# Patient Record
Sex: FEMALE | Race: WHITE | URBAN - METROPOLITAN AREA
[De-identification: names, ages, dates, MRNs, and addresses within clinical notes are randomized per-mention and may not be internally consistent; named-entity substitution may affect disease eponyms.]

---

## 2021-04-06 ENCOUNTER — OFFICE VISIT (OUTPATIENT)
Dept: ORTHOPEDIC SURGERY | Age: 81
End: 2021-04-06
Payer: MEDICARE

## 2021-04-06 VITALS — WEIGHT: 180 LBS | BODY MASS INDEX: 31.89 KG/M2 | HEIGHT: 63 IN | TEMPERATURE: 97.7 F

## 2021-04-06 DIAGNOSIS — M25.511 BILATERAL SHOULDER PAIN, UNSPECIFIED CHRONICITY: ICD-10-CM

## 2021-04-06 DIAGNOSIS — M19.019 ARTHRITIS OF SHOULDER: Primary | ICD-10-CM

## 2021-04-06 DIAGNOSIS — M25.512 BILATERAL SHOULDER PAIN, UNSPECIFIED CHRONICITY: ICD-10-CM

## 2021-04-06 PROCEDURE — 20610 DRAIN/INJ JOINT/BURSA W/O US: CPT | Performed by: ORTHOPAEDIC SURGERY

## 2021-04-06 PROCEDURE — G8400 PT W/DXA NO RESULTS DOC: HCPCS | Performed by: ORTHOPAEDIC SURGERY

## 2021-04-06 PROCEDURE — 1036F TOBACCO NON-USER: CPT | Performed by: ORTHOPAEDIC SURGERY

## 2021-04-06 PROCEDURE — G8417 CALC BMI ABV UP PARAM F/U: HCPCS | Performed by: ORTHOPAEDIC SURGERY

## 2021-04-06 PROCEDURE — 1123F ACP DISCUSS/DSCN MKR DOCD: CPT | Performed by: ORTHOPAEDIC SURGERY

## 2021-04-06 PROCEDURE — G8427 DOCREV CUR MEDS BY ELIG CLIN: HCPCS | Performed by: ORTHOPAEDIC SURGERY

## 2021-04-06 PROCEDURE — 99204 OFFICE O/P NEW MOD 45 MIN: CPT | Performed by: ORTHOPAEDIC SURGERY

## 2021-04-06 PROCEDURE — 4040F PNEUMOC VAC/ADMIN/RCVD: CPT | Performed by: ORTHOPAEDIC SURGERY

## 2021-04-06 PROCEDURE — 1090F PRES/ABSN URINE INCON ASSESS: CPT | Performed by: ORTHOPAEDIC SURGERY

## 2021-04-06 RX ORDER — LOSARTAN POTASSIUM 100 MG/1
100 TABLET ORAL DAILY
COMMUNITY
Start: 2020-11-20

## 2021-04-06 RX ORDER — HYDROCHLOROTHIAZIDE 25 MG/1
25 TABLET ORAL DAILY
COMMUNITY
Start: 2020-11-20

## 2021-04-06 RX ORDER — METFORMIN HYDROCHLORIDE 500 MG/1
500 TABLET, EXTENDED RELEASE ORAL
COMMUNITY
Start: 2020-11-19

## 2021-04-06 NOTE — PROGRESS NOTES
Chief Complaint  Chief Complaint   Patient presents with    Shoulder Pain     Old patient, new problem, Bilateral shoulder pain, left worse than right       History of Present Illness:  Espinoza Domínguez is a 68 y.o. female here for evaluation of the left shoulder. Patient is known to Dr. Sonam Martin and was last seen in 2016 for left shoulder osteoarthritis. She was given a cortisone injection into the left shoulder at that time which lasted her for several years. She returns today for 1 year history of returned left shoulder pain which started when she fell out of bed. Since that time she has tried topical anti-inflammatories without much relief. She would like to receive another cortisone injection today. She denies any mechanical symptoms of the shoulder including locking or catching but does endorse popping    Medical History:  Patient's medications, allergies, past medical, surgical, social and family histories were reviewed and updated as appropriate. Pertinent items are noted in HPI  Review of systems reviewed from Patient History Form dated on 4/6/2021 and available in the patient's chart under the Media tab. Vital Signs:  Vitals:    04/06/21 1045   Temp: 97.7 °F (36.5 °C)         Neuro: Alert & oriented x 3,  normal,  no focal deficits noted. Normal affect. Eyes: sclera clear  Ears: Normal external ear  Mouth:  No perioral lesions  Pulm: Respirations unlabored and regular  Pulse: Regular rate and rhythm   Skin: Warm, well perfused      Constitutional: The physical examination finds the patient to be well-developed and well-nourished. The patient is alert and oriented x3 and was cooperative throughout the visit. Left shoulder exam    Inspection:  Held in a normal posture. Normal contour at the acromioclavicular joint. No swelling, ecchymosis, or erythema about the shoulder. No atrophy appreciated. No scapular winging.      Palpation: Mild tenderness to palpation over the posterior joint line.  No subacromial crepitus. No tenderness of the AC joint. No greater tuberosity tenderness. No tenderness in the bicipital groove. Range of Motion: Forward flexion and abduction to 90 degrees, external rotation to 15 degrees and internal rotation to the back pocket    Strength: 4+/5 supraspinatus, infraspinatus, and subscapularis muscle strength which is symmetric bilaterally. Stability: No anterior instability. No posterior instability. Special Tests: Belly press sign is negative. Lift off sign is negative. Other findings: The skin is warm dry and well perfused. 2+ radial pulse. Sensation is intact to light touch over the deltoid. Right comparison shoulder exam    Inspection:  Held in a normal posture. Normal contour at the acromioclavicular joint. No swelling, ecchymosis, or erythema about the shoulder. No atrophy appreciated. No scapular winging. Palpation:  No subacromial crepitus. No tenderness of the AC joint. No greater tuberosity tenderness. No tenderness in the bicipital groove. Range of Motion: Full passive and active ROM. Normal scapulothoracic rhythm. Strength:  4+/5 supraspinatus, infraspinatus, and subscapularis muscle strength which is symmetric bilaterally. Stability: No anterior instability. No posterior instability. Special Tests: Impingement findings are negative. Labral findings are negative. Speed sign and Yergason signs are both negative. Crossover sign is negative. Belly press sign is negative. Lift off sign is negative. Other findings: The skin is warm dry and well perfused. 2+ radial pulse. Sensation is intact to light touch over the deltoid.       Radiology:     Plain radiographs of bilateral shoulder comprising AP, axillary lateral and outlet views: Radiographs were obtained and reviewed in the office:     Impression: Left shoulder demonstrates severe/advanced bone-on-bone arthritis in the right shoulder demonstrates moderate to severe osteoarthritic changes. There is no obvious evidence of superior humeral head migration         Assessment :  68 y.o. female with advanced osteoarthritis of bilateral shoulders, left greater than right    Impression:  Encounter Diagnosis   Name Primary?  Bilateral shoulder pain, unspecified chronicity Yes       Office Procedures:  Orders Placed This Encounter   Procedures    XR SHOULDER LEFT (MIN 2 VIEWS)     Standing Status:   Future     Number of Occurrences:   1     Standing Expiration Date:   4/6/2022     Order Specific Question:   Reason for exam:     Answer:   shoulder pain    XR SHOULDER RIGHT (MIN 2 VIEWS)     Standing Status:   Future     Number of Occurrences:   1     Standing Expiration Date:   4/6/2022     Order Specific Question:   Reason for exam:     Answer:   shoulder pain           Plan: We had a long discussion with Taiwo Doyle today regarding her bilateral shoulder pain. Her left shoulder bothers her much more than her right and is stopping her from doing her activities of daily living. She is requesting a cortisone injection into the left shoulder today and we feel that that is an appropriate next step. She had good results from her prior injection. We also had a good discussion with her regarding the long-term treatment for this arthritis being a shoulder replacement. Taiwo Doyle does not want to have that discussion at this time until cortisone injections stop working for her. We also provided her with a therapy referral to show her some home exercise and stretching program and provided her with a printout to supplement that. We will see her back as needed. Paola Hernandez is in agreement with this plan. All questions were answered to patient's satisfaction and was encouraged to call with any further questions. The indications and risks of steroid injection as well as treatment alternatives were discussed with the patient who consented to the procedure.  Under sterile conditions and after informed consent was obtained, using posterior approach the patient was given an injection into the Left shoulder glenohumeral joint. 2mL 40 mg of Depo-Medrol and 4 mL of 1% lidocaine were placed in the shoulder after it was prepped with chlorhexidine. This resulted in good relief of symptoms. There were no complications. The patient was advised to ice the shoulder this evening and to avoid vigorous activities for the next 2 days. They were advised to call us if there was any erythema, enduration, swelling or increasing pain. Total time spent for evaluation, education and development of treatment plan: 39 minutes    Dandre Mistry, [address] [facility]     04/06/21  11:57 AM    I attest that I met personally with the patient, performed the described exam, reviewed the radiographic studies and medical records associated with this patient and supervised the services that are described above.      Salvador Yanez MD

## 2021-12-21 ENCOUNTER — OFFICE VISIT (OUTPATIENT)
Dept: ORTHOPEDIC SURGERY | Age: 81
End: 2021-12-21
Payer: MEDICARE

## 2021-12-21 VITALS — HEIGHT: 62 IN | WEIGHT: 190 LBS | BODY MASS INDEX: 34.96 KG/M2

## 2021-12-21 DIAGNOSIS — M25.512 BILATERAL SHOULDER PAIN, UNSPECIFIED CHRONICITY: Primary | ICD-10-CM

## 2021-12-21 DIAGNOSIS — M25.511 BILATERAL SHOULDER PAIN, UNSPECIFIED CHRONICITY: Primary | ICD-10-CM

## 2021-12-21 PROCEDURE — G8484 FLU IMMUNIZE NO ADMIN: HCPCS | Performed by: ORTHOPAEDIC SURGERY

## 2021-12-21 PROCEDURE — G8427 DOCREV CUR MEDS BY ELIG CLIN: HCPCS | Performed by: ORTHOPAEDIC SURGERY

## 2021-12-21 PROCEDURE — 20610 DRAIN/INJ JOINT/BURSA W/O US: CPT | Performed by: ORTHOPAEDIC SURGERY

## 2021-12-21 PROCEDURE — 1123F ACP DISCUSS/DSCN MKR DOCD: CPT | Performed by: ORTHOPAEDIC SURGERY

## 2021-12-21 PROCEDURE — 1090F PRES/ABSN URINE INCON ASSESS: CPT | Performed by: ORTHOPAEDIC SURGERY

## 2021-12-21 PROCEDURE — 4040F PNEUMOC VAC/ADMIN/RCVD: CPT | Performed by: ORTHOPAEDIC SURGERY

## 2021-12-21 PROCEDURE — G8417 CALC BMI ABV UP PARAM F/U: HCPCS | Performed by: ORTHOPAEDIC SURGERY

## 2021-12-21 PROCEDURE — 99214 OFFICE O/P EST MOD 30 MIN: CPT | Performed by: ORTHOPAEDIC SURGERY

## 2021-12-21 PROCEDURE — G8400 PT W/DXA NO RESULTS DOC: HCPCS | Performed by: ORTHOPAEDIC SURGERY

## 2021-12-21 PROCEDURE — 1036F TOBACCO NON-USER: CPT | Performed by: ORTHOPAEDIC SURGERY

## 2021-12-21 RX ORDER — METHYLPREDNISOLONE ACETATE 40 MG/ML
40 INJECTION, SUSPENSION INTRA-ARTICULAR; INTRALESIONAL; INTRAMUSCULAR; SOFT TISSUE ONCE
Status: COMPLETED | OUTPATIENT
Start: 2021-12-21 | End: 2021-12-21

## 2021-12-21 RX ORDER — METHYLPREDNISOLONE ACETATE 40 MG/ML
40 INJECTION, SUSPENSION INTRA-ARTICULAR; INTRALESIONAL; INTRAMUSCULAR; SOFT TISSUE ONCE
Status: CANCELLED | OUTPATIENT
Start: 2021-12-21 | End: 2021-12-21

## 2021-12-21 RX ADMIN — METHYLPREDNISOLONE ACETATE 40 MG: 40 INJECTION, SUSPENSION INTRA-ARTICULAR; INTRALESIONAL; INTRAMUSCULAR; SOFT TISSUE at 17:11

## 2021-12-21 NOTE — PROGRESS NOTES
12/21/21 4:41 PM     Lidocaine Injection      NDC: [address]-87    Lot Number: [id number]    Body Part: left shoulder

## 2021-12-21 NOTE — PROGRESS NOTES
Chief Complaint    Follow-up (bilateral shoulders )      History of Present Illness:  Jeff Lamb is a 80 y.o. female here today for follow up evaluation of the bilateral shoulder. She has known osteoarthritis in both of her shoulders. She has had good relief of symptoms with previous corticosteroid injection on 4/6/2021. She returns today with an exacerbation of symptoms over the last month, no new injuries reported. She complains of constant shoulder pain, left worse than right. Pain is exacerbated with movement. She knows she is a candidate for shoulder replacements but is a breast and ovarian cancer survivor and would not like to pursue surgery. Medical History:  Patient's medications, allergies, past medical, surgical, social and family histories were reviewed and updated as appropriate. Pertinent items are noted in HPI  Review of systems reviewed from Patient History Form completed today and available in the patient's chart under the Media tab. Pain Assessment  Location of Pain: Shoulder  Location Modifiers: Left,Right  Severity of Pain: 4  Quality of Pain: Dull,Aching  Duration of Pain: Persistent  Frequency of Pain: Constant  Aggravating Factors:  (any movement)  Limiting Behavior: Yes  Relieving Factors: Rest  Result of Injury: No  Work-Related Injury: No  Are there other pain locations you wish to document?: No    Past Medical History:   Diagnosis Date    Cancer ([facility] [address]     Cervical spondylosis 5/19/2015    Glenohumeral arthritis 5/19/2015        History reviewed. No pertinent surgical history. History reviewed. No pertinent family history.     Social History     Socioeconomic History    Marital status:      Spouse name: None    Number of children: None    Years of education: None    Highest education level: None   Occupational History    None   Tobacco Use    Smoking status: Never Smoker    Smokeless tobacco: Never Used   Substance and Sexual Activity    Alcohol use: None  Drug use: None    Sexual activity: None   Other Topics Concern    None   Social History Narrative    None     Social Determinants of Health     Financial Resource Strain:     Difficulty of Paying Living Expenses: Not on file   Food Insecurity:     Worried About Running Out of Food in the Last Year: Not on file    [place] of Food in the Last Year: Not on file   Transportation Needs:     Lack of Transportation (Medical): Not on file    Lack of Transportation (Non-Medical): Not on file   Physical Activity:     Days of Exercise per Week: Not on file    Minutes of Exercise per Session: Not on file   Stress:     Feeling of Stress : Not on file   Social Connections:     Frequency of Communication with Friends and Family: Not on file    Frequency of Social Gatherings with Friends and Family: Not on file    Attends Spiritism Services: Not on file    Active Member of [address] [place] [address] [employer] or Organizations: Not on file    Attends Club or Organization Meetings: Not on file    Marital Status: Not on file   Intimate Partner Violence:     Fear of Current or Ex-Partner: Not on file    Emotionally Abused: Not on file    Physically Abused: Not on file    Sexually Abused: Not on file   Housing Stability:     Unable to Pay for Housing in the Last Year: Not on file    Number of [place] in the Last Year: Not on file    Unstable Housing in the Last Year: Not on file       Current Outpatient Medications   Medication Sig Dispense Refill    hydroCHLOROthiazide (HYDRODIURIL) 25 MG tablet Take 25 mg by mouth daily      losartan (COZAAR) 100 MG tablet Take 100 mg by mouth daily      metFORMIN (GLUCOPHAGE-XR) 500 MG extended release tablet Take 500 mg by mouth daily (with breakfast)      erythromycin (ROMYCIN) 5 MG/GM ophthalmic ointment        No current facility-administered medications for this visit. Allergies   Allergen Reactions    Codeine Nausea And Vomiting     Codeine Phosphate.        Vital signs:  Ht 5' 2\" (1.575 m)   Wt 190 lb (86.2 kg)   BMI 34.75 kg/m²            LEFT Shoulder Examination:    Inspection:  Held in a normal posture. Normal contour at the acromioclavicular joint. No swelling, ecchymosis, or erythema about the shoulder. No atrophy appreciated. Palpation:  Crepitus. Range of Motion: Marked limitation of motion. 45 degrees of forward flexion and abduction. 0 degrees of internal and external rotation. Strength:  Weakness. Stability: No anterior instability. No posterior instability. Special Tests:  Crossover sign is negative. Belly press sign is negative. Lift off sign is negative. Impingement findings are negative. Labral findings are negative. Speed sign and Yergason signs are both negative. Other findings: The skin is warm dry and well perfused. Distally neurovascularly intact. Sensation is intact to light touch over the deltoid. RIGHT Shoulder Examination:    Inspection:  Held in a normal posture. Normal contour at the acromioclavicular joint. No swelling, ecchymosis, or erythema about the shoulder. No atrophy appreciated. Palpation:  Crepitus. Range of Motion: Marked limitation of motion. Strength:  Weakness. Stability: No anterior instability. No posterior instability. Special Tests:  Crossover sign is negative. Belly press sign is negative. Lift off sign is negative. Impingement findings are negative. Labral findings are negative. Speed sign and Yergason signs are both negative. Other findings: The skin is warm dry and well perfused. Distally neurovascularly intact. Sensation is intact to light touch over the deltoid. Radiology:     Pertinent imaging was interpreted and reviewed with the patient. No new imaging was obtained during today's visit. Assessment :  Bilateral shoulder osteoarthritis    Impression:  Encounter Diagnosis   Name Primary?     Bilateral shoulder pain, unspecified chronicity Yes       Office Procedures:  Orders Placed This Encounter   Procedures    [place] ARTHROCENTESIS ASPIR&/INJ MAJOR JT/BURSA W/O US           Plan: The nature and natural history of osteoarthritis was discussed in detail the patient today. Treatment options both surgical and nonsurgical were discussed in detail. Patient was counseled with regard to the importance of activity modification as well as weight control. The role for medications, intra-articular injections as well as surgery were discussed. Patient's questions were answered.     Patient has known osteoarthritis of bilateral shoulders. She has had good relief of symptoms with previous corticosteroid injection in her left shoulder. She is a candidate for shoulder replacement but would like to conservative treatment at this time. She is a candidate for a repeat injection for her left shoulder pain and inflammation. She wishes to proceed. We can see her back in 1 week to proceed with the right shoulder. The indications and risks of steroid injection as well as treatment alternatives were discussed with the patient who consented to the procedure. Under sterile conditions and after informed consent was obtained, using posterior approach the patient was given an injection into the LEFT shoulder split equally between subacromial space and glenohumeral joint. 2mL 40 mg of Depo-Medrol and 4 mL of 1% lidocaine were placed in the shoulder after it was prepped with chlorhexidine. This resulted in good relief of symptoms. There were no complications. The patient was advised to ice the shoulder this evening and to avoid vigorous activities for the next 2 days. They were advised to call us if there was any erythema, enduration, swelling or increasing pain. I will see her back in 1 week to proceed with a right shoulder injection, sooner if needed. Dara Smith is in agreement with this plan.  All questions were answered to patient's satisfaction and was encouraged to call with any further questions. Total time spent for evaluation, education and development of treatment plan: 35 minutes        I, Fermin Cushing, ATC, am scribing for and in the presence of Dr. Rufino Painter. 12/21/21 5:36 PM Fermin Cushing, ATC             I attest that I met personally with the patient, performed the described exam, reviewed the radiographic studies and medical records associated with this patient and supervised the services that are described above.      Brett Ramirez MD

## 2021-12-29 ENCOUNTER — OFFICE VISIT (OUTPATIENT)
Dept: ORTHOPEDIC SURGERY | Age: 81
End: 2021-12-29
Payer: MEDICARE

## 2021-12-29 VITALS — WEIGHT: 190 LBS | HEIGHT: 62 IN | BODY MASS INDEX: 34.96 KG/M2

## 2021-12-29 DIAGNOSIS — M25.511 BILATERAL SHOULDER PAIN, UNSPECIFIED CHRONICITY: Primary | ICD-10-CM

## 2021-12-29 DIAGNOSIS — M25.512 BILATERAL SHOULDER PAIN, UNSPECIFIED CHRONICITY: Primary | ICD-10-CM

## 2021-12-29 DIAGNOSIS — M19.011 PRIMARY OSTEOARTHRITIS OF BOTH SHOULDERS: ICD-10-CM

## 2021-12-29 DIAGNOSIS — M19.012 PRIMARY OSTEOARTHRITIS OF BOTH SHOULDERS: ICD-10-CM

## 2021-12-29 PROCEDURE — 20610 DRAIN/INJ JOINT/BURSA W/O US: CPT | Performed by: ORTHOPAEDIC SURGERY

## 2021-12-29 RX ORDER — METHYLPREDNISOLONE ACETATE 40 MG/ML
40 INJECTION, SUSPENSION INTRA-ARTICULAR; INTRALESIONAL; INTRAMUSCULAR; SOFT TISSUE ONCE
Status: COMPLETED | OUTPATIENT
Start: 2021-12-29 | End: 2021-12-29

## 2021-12-29 RX ADMIN — METHYLPREDNISOLONE ACETATE 40 MG: 40 INJECTION, SUSPENSION INTRA-ARTICULAR; INTRALESIONAL; INTRAMUSCULAR; SOFT TISSUE at 12:16

## 2021-12-29 NOTE — PROGRESS NOTES
Follow-up (right shoulder )      History of Present Illness:  Carlos Hillman is a 80 y.o. female here for follow-up regarding her bilateral shoulders. As a review, she has known bilateral shoulder osteoarthritis. She underwent a left shoulder cortisone injection a week ago with some resolution of symptoms. She is here today to undergo a right shoulder intra-articular cortisone injection. Of note though she is a candidate for a shoulder replacement, she has a history of breast and ovarian cancer and would like to avoid surgery at all costs. Medical History:  Patient's medications, allergies, past medical, surgical, social and family histories were reviewed and updated as appropriate. Pain Assessment  Location of Pain: Shoulder  Location Modifiers: Right  Severity of Pain: 4  Quality of Pain: Dull,Aching  Duration of Pain: Persistent  Frequency of Pain: Constant  Aggravating Factors:  (any general shoulder movement)  Limiting Behavior: Yes  Relieving Factors: Rest  Result of Injury: No  Work-Related Injury: No  Are there other pain locations you wish to document?: No  ROS: Review of systems reviewed from Patient History Form completed today and available in the patient's chart under the Media tab. Pertinent items are noted in HPI  Review of systems reviewed from Patient History Form completed today and available in the patient's chart under the Media tab. Vital Signs:  Ht 5' 2\" (1.575 m)   Wt 190 lb (86.2 kg)   BMI 34.75 kg/m²     Right shoulder exam    Inspection:  Held in a normal posture. Normal contour at the acromioclavicular joint. No swelling, ecchymosis, or erythema about the shoulder. No atrophy appreciated. No scapular winging. Palpation:  Crepitus. No tenderness of the AC joint. No greater tuberosity tenderness. No tenderness in the bicipital groove. Range of Motion: Limited passive and active ROM    Strength:  Mild rotator cuff weakness    Stability: No anterior instability. No posterior instability. Special Tests: Impingement findings are mildly positive. Other findings: The skin is warm dry and well perfused. 2+ radial pulse. Sensation is intact to light touch over the deltoid. Left shoulder exam    Inspection:  Held in a normal posture. Normal contour at the acromioclavicular joint. No swelling, ecchymosis, or erythema about the shoulder. No atrophy appreciated. No scapular winging. Palpation:  Crepitus. No tenderness of the AC joint. No greater tuberosity tenderness. No tenderness in the bicipital groove. Range of Motion: Limited passive and active ROM    Strength:  Mild rotator cuff weakness    Stability: No anterior instability. No posterior instability. Special Tests: Impingement findings are mildly positive. Other findings: The skin is warm dry and well perfused. 2+ radial pulse. Sensation is intact to light touch over the deltoid. Radiology:       Pertinent imaging interpreted and reviewed with the patient today, images only - no report available. No new imaging was obtained during today's visit. Assessment :  80 y.o. female with bilateral shoulder osteoarthritis    Impression:  Encounter Diagnoses   Name Primary?  Bilateral shoulder pain, unspecified chronicity Yes    Primary osteoarthritis of both shoulders        Office Procedures:  Orders Placed This Encounter   Procedures    [place] ARTHROCENTESIS ASPIR&/INJ MAJOR JT/BURSA W/O US           Plan:   Pertinent imaging was reviewed. The etiology, natural history, and treatment options for the disorder were discussed. The roles of activity medication, antiinflammatories, injections, bracing, physical therapy, and surgical interventions were all described to the patient and questions were answered. Ed Britt has bilateral shoulder osteoarthritis. She responded moderately to her left shoulder injection. She is here today for her right shoulder cortisone injection.   I I believe she is a candidate for this because of her severe advanced shoulder osteoarthritis. She would like to proceed with this. .     The indications and risks of steroid injection as well as treatment alternatives were discussed with the patient who consented to the procedure. Under sterile conditions and after informed consent was obtained, using posterior approach the patient was given an injection into the right shoulder subacromial space. 2mL 40 mg of Depo-Medrol and 4 mL of 1% lidocaine were placed in the shoulder after it was prepped with chlorhexidine. This resulted in good relief of symptoms. There were no complications. The patient was advised to ice the shoulder this evening and to avoid vigorous activities for the next 2 days. They were advised to call us if there was any erythema, enduration, swelling or increasing pain. Follow-up if no improvement or worsening symptom  Pasquale Pollard is in agreement with this plan. All questions were answered to patient's satisfaction and was encouraged to call with any further questions. Tammy Vaz PA-C  [address] [place] [address]  12/29/2021    During this exam, I, Tammy Vaz PA-C, functioned as a scribe for Dr. Mario Higgins. The history taking and physical examination were performed by Dr. Mario Higgins. All counseling during the appointment was performed between the patient and Dr. Mario Higgins. 12/29/2021 1:42 PM    This dictation was performed with a verbal recognition program (DRAGON) and it was checked for errors. It is possible that there are still dictated errors within this office note. If so, please bring any areas to my attention for an addendum. All efforts were made to ensure that this office note is accurate. I attest that I met personally with the patient, performed the described exam, reviewed the radiographic studies and medical records associated with this patient and supervised the services that are described above.      Zenia Soliz MD

## 2021-12-29 NOTE — PROGRESS NOTES
12/29/21 9:37 AM     Lidocaine Injection      NDC: [address]-36    Lot Number: [id number]    Body Part: right shoulder

## 2022-04-27 ENCOUNTER — OFFICE VISIT (OUTPATIENT)
Dept: ORTHOPEDIC SURGERY | Age: 82
End: 2022-04-27
Payer: MEDICARE

## 2022-04-27 VITALS — WEIGHT: 190 LBS | HEIGHT: 62 IN | BODY MASS INDEX: 34.96 KG/M2

## 2022-04-27 DIAGNOSIS — M25.512 LEFT SHOULDER PAIN, UNSPECIFIED CHRONICITY: Primary | ICD-10-CM

## 2022-04-27 DIAGNOSIS — M19.011 PRIMARY OSTEOARTHRITIS OF BOTH SHOULDERS: ICD-10-CM

## 2022-04-27 DIAGNOSIS — M25.511 RIGHT SHOULDER PAIN, UNSPECIFIED CHRONICITY: ICD-10-CM

## 2022-04-27 DIAGNOSIS — M19.012 PRIMARY OSTEOARTHRITIS OF BOTH SHOULDERS: ICD-10-CM

## 2022-04-27 PROCEDURE — G8427 DOCREV CUR MEDS BY ELIG CLIN: HCPCS | Performed by: ORTHOPAEDIC SURGERY

## 2022-04-27 PROCEDURE — G8417 CALC BMI ABV UP PARAM F/U: HCPCS | Performed by: ORTHOPAEDIC SURGERY

## 2022-04-27 PROCEDURE — 20610 DRAIN/INJ JOINT/BURSA W/O US: CPT | Performed by: ORTHOPAEDIC SURGERY

## 2022-04-27 PROCEDURE — 1123F ACP DISCUSS/DSCN MKR DOCD: CPT | Performed by: ORTHOPAEDIC SURGERY

## 2022-04-27 PROCEDURE — G8400 PT W/DXA NO RESULTS DOC: HCPCS | Performed by: ORTHOPAEDIC SURGERY

## 2022-04-27 PROCEDURE — 4040F PNEUMOC VAC/ADMIN/RCVD: CPT | Performed by: ORTHOPAEDIC SURGERY

## 2022-04-27 PROCEDURE — 99214 OFFICE O/P EST MOD 30 MIN: CPT | Performed by: ORTHOPAEDIC SURGERY

## 2022-04-27 PROCEDURE — 1036F TOBACCO NON-USER: CPT | Performed by: ORTHOPAEDIC SURGERY

## 2022-04-27 PROCEDURE — 1090F PRES/ABSN URINE INCON ASSESS: CPT | Performed by: ORTHOPAEDIC SURGERY

## 2022-04-27 RX ORDER — METHYLPREDNISOLONE ACETATE 40 MG/ML
40 INJECTION, SUSPENSION INTRA-ARTICULAR; INTRALESIONAL; INTRAMUSCULAR; SOFT TISSUE ONCE
Status: COMPLETED | OUTPATIENT
Start: 2022-04-27 | End: 2022-04-27

## 2022-04-27 RX ADMIN — METHYLPREDNISOLONE ACETATE 40 MG: 40 INJECTION, SUSPENSION INTRA-ARTICULAR; INTRALESIONAL; INTRAMUSCULAR; SOFT TISSUE at 13:41

## 2022-04-27 NOTE — PROGRESS NOTES
4/27/22 1:25 PM     Lidocaine Injection      NDC: [address]-57    Lot Number: [id number]    Body Part: left shoulder

## 2022-04-27 NOTE — PROGRESS NOTES
Follow-up (left shoulder )      History of Present Illness:  Cornell Hutchinson is a 80 y.o. female here for follow-up regarding her bilateral shoulders. 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 She underwent a right shoulder intra-articular cortisone injection on 12/29/2021 and a left shoulder on 12/21/2021 with good resolution of symptoms until recently. Patient describes deep achy pain in her bilateral shoulders exacerbated by any type of activity. Of note she is a candidate for a shoulder replacement but she does have a positive history of breast cancer and ovarian cancer and would like to avoid surgery at all costs. Medical History:  Patient's medications, allergies, past medical, surgical, social and family histories were reviewed and updated as appropriate. ROS: Review of systems reviewed from Patient History Form completed today and available in the patient's chart under the Media tab. Pertinent items are noted in HPI  Review of systems reviewed from Patient History Form completed today and available in the patient's chart under the Media tab. Vital Signs: There were no vitals taken for this visit. Left shoulder exam    Inspection:  Held in a normal posture. Normal contour at the acromioclavicular joint. No swelling, ecchymosis, or erythema about the shoulder. No atrophy appreciated. No scapular winging. Palpation:  Crepitus. No tenderness of the AC joint. No greater tuberosity tenderness. No tenderness in the bicipital groove. Range of Motion: Limited passive and active ROM    Strength:  Mild rotator cuff weakness    Stability: No anterior instability. No posterior instability. Special Tests: Impingement findings are mildly positive. Other findings: The skin is warm dry and well perfused. 2+ radial pulse. Sensation is intact to light touch over the deltoid.       Right shoulder exam    Inspection:  Held in a normal posture. Normal contour at the acromioclavicular joint. No swelling, ecchymosis, or erythema about the shoulder. No atrophy appreciated. No scapular winging. Palpation:  Crepitus. No tenderness of the AC joint. No greater tuberosity tenderness. No tenderness in the bicipital groove. Range of Motion: Limited passive and active ROM    Strength:  Mild rotator cuff weakness    Stability: No anterior instability. No posterior instability. Special Tests: Impingement findings are mildly positive. Other findings: The skin is warm dry and well perfused. 2+ radial pulse. Sensation is intact to light touch over the deltoid. Radiology:       Pertinent imaging interpreted and reviewed with the patient today, images only - no report available. X-rays of the bilateral shoulder including  Grashey,  scapular Y and  axillary views were obtained and reviewed in office:    Impression: Severe advanced bilateral shoulder osteoarthritis         Assessment :  80 y.o. female with bilateral shoulder osteoarthritis    Impression:  Encounter Diagnoses   Name Primary?  Left shoulder pain, unspecified chronicity Yes    Right shoulder pain, unspecified chronicity     Primary osteoarthritis of both shoulders        Office Procedures:  Orders Placed This Encounter   Procedures    XR SHOULDER LEFT (MIN 2 VIEWS)     Standing Status:   Future     Number of Occurrences:   1     Standing Expiration Date:   4/27/2023     Order Specific Question:   Reason for exam:     Answer:   pain    XR SHOULDER RIGHT (MIN 2 VIEWS)     Standing Status:   Future     Number of Occurrences:   1     Standing Expiration Date:   4/27/2023     Order Specific Question:   Reason for exam:     Answer:   shoulder pain    MA ARTHROCENTESIS ASPIR&/INJ MAJOR JT/BURSA W/O US           Plan:   Pertinent imaging was reviewed. The etiology, natural history, and treatment options for the disorder were discussed.   The roles of activity medication, antiinflammatories, injections, bracing, physical therapy, and surgical interventions were all described to the patient and questions were answered. Andrew has bilateral shoulder osteoarthritis. She ultimately will require bilateral shoulder replacements but would like to avoid this as long as she can. Today she is a candidate for a left intra-articular cortisone injection. She will follow-up in 1 week for her right. The indications and risks of steroid injection as well as treatment alternatives were discussed with the patient who consented to the procedure. Under sterile conditions and after informed consent was obtained, using posterior approach the patient was given an injection into the left shoulder split equally between subacromial space and glenohumeral joint. 2mL 40 mg of Depo-Medrol and 4 mL of 1% lidocaine were placed in the shoulder after it was prepped with chlorhexidine. This resulted in good relief of symptoms. There were no complications. The patient was advised to ice the shoulder this evening and to avoid vigorous activities for the next 2 days. They were advised to call us if there was any erythema, enduration, swelling or increasing pain. Macie Casas is in agreement with this plan. All questions were answered to patient's satisfaction and was encouraged to call with any further questions. Total time spent for evaluation, education, and development of treatment plan: 35 minutes    Giancarlo Gonzalez  4/27/2022    During this exam, Mayra SEBASTIAN PA-C, functioned as a scribe for Dr. Daria Fournier. The history taking and physical examination were performed by Dr. Daria Fournier. All counseling during the appointment was performed between the patient and Dr. Daria Fournier. 4/27/2022 1:34 PM    This dictation was performed with a verbal recognition program (DRAGON) and it was checked for errors.   It is possible that there are still dictated errors within this office note. If so, please bring any areas to my attention for an addendum. All efforts were made to ensure that this office note is accurate. I attest that I met personally with the patient, performed the described exam, reviewed the radiographic studies and medical records associated with this patient and supervised the services that are described above.      Magdalena Pedraza MD

## 2022-05-04 ENCOUNTER — OFFICE VISIT (OUTPATIENT)
Dept: ORTHOPEDIC SURGERY | Age: 82
End: 2022-05-04
Payer: MEDICARE

## 2022-05-04 VITALS — WEIGHT: 190 LBS | HEIGHT: 62 IN | BODY MASS INDEX: 34.96 KG/M2

## 2022-05-04 DIAGNOSIS — M19.011 PRIMARY OSTEOARTHRITIS OF BOTH SHOULDERS: ICD-10-CM

## 2022-05-04 DIAGNOSIS — M19.012 PRIMARY OSTEOARTHRITIS OF BOTH SHOULDERS: ICD-10-CM

## 2022-05-04 DIAGNOSIS — M19.019 ARTHRITIS OF SHOULDER: Primary | ICD-10-CM

## 2022-05-04 PROCEDURE — 20610 DRAIN/INJ JOINT/BURSA W/O US: CPT | Performed by: ORTHOPAEDIC SURGERY

## 2022-05-04 RX ORDER — METHYLPREDNISOLONE ACETATE 40 MG/ML
40 INJECTION, SUSPENSION INTRA-ARTICULAR; INTRALESIONAL; INTRAMUSCULAR; SOFT TISSUE ONCE
Status: COMPLETED | OUTPATIENT
Start: 2022-05-04 | End: 2022-05-04

## 2022-05-04 RX ADMIN — METHYLPREDNISOLONE ACETATE 40 MG: 40 INJECTION, SUSPENSION INTRA-ARTICULAR; INTRALESIONAL; INTRAMUSCULAR; SOFT TISSUE at 16:43

## 2022-05-04 NOTE — PROGRESS NOTES
5/4/22 1:46 PM     Lidocaine Injection      NDC: [address]-91    Lot Number: [id number]    Body Part: right shoulder

## 2022-05-04 NOTE — PROGRESS NOTES
Chief Complaint    Shoulder Pain (right shoulder)      History of Present Illness:  Cornell Hutchinson is a 80 y.o. female here today for follow up evaluation of the right shoulder. 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 She has been treated previously with bilateral cortisone injections most recently the left shoulder on 4/27/2022 with good resolution of symptoms. Patient describes deep achy pain in her bilateral shoulders exacerbated by any type of activity. Of note she is a candidate for a shoulder replacement but she does have a positive history of breast cancer and ovarian cancer and would like to avoid surgery at all costs. Medical History:  Patient's medications, allergies, past medical, surgical, social and family histories were reviewed and updated as appropriate. Pertinent items are noted in HPI  Review of systems reviewed from Patient History Form completed today and available in the patient's chart under the Media tab. Pain Assessment  Location of Pain: Shoulder  Location Modifiers: Right  Severity of Pain: 4  Quality of Pain: Aching  Duration of Pain: Persistent  Frequency of Pain: Intermittent  Aggravating Factors: Other (Comment) (lifting, pushing)  Limiting Behavior: Yes  Relieving Factors: Rest  Result of Injury: No  Work-Related Injury: No  Are there other pain locations you wish to document?: No    Past Medical History:   Diagnosis Date    Cancer ([facility] [address]     Cervical spondylosis 5/19/2015    Glenohumeral arthritis 5/19/2015        No past surgical history on file. No family history on file.     Social History     Socioeconomic History    Marital status:      Spouse name: Not on file    Number of children: Not on file    Years of education: Not on file    Highest education level: Not on file   Occupational History    Not on file   Tobacco Use    Smoking status: Never Smoker    Smokeless tobacco: Never Used   Substance and Sexual Activity    Alcohol use: Not on file    Drug use: Not on file    Sexual activity: Not on file   Other Topics Concern    Not on file   Social History Narrative    Not on file     Social Determinants of Health     Financial Resource Strain:     Difficulty of Paying Living Expenses: Not on file   Food Insecurity:     Worried About Running Out of Food in the Last Year: Not on file    [place] of Food in the Last Year: Not on file   Transportation Needs:     Lack of Transportation (Medical): Not on file    Lack of Transportation (Non-Medical): Not on file   Physical Activity:     Days of Exercise per Week: Not on file    Minutes of Exercise per Session: Not on file   Stress:     Feeling of Stress : Not on file   Social Connections:     Frequency of Communication with Friends and Family: Not on file    Frequency of Social Gatherings with Friends and Family: Not on file    Attends Confucianism Services: Not on file    Active Member of [address] [place] [address] or Organizations: Not on file    Attends Club or Organization Meetings: Not on file    Marital Status: Not on file   Intimate Partner Violence:     Fear of Current or Ex-Partner: Not on file    Emotionally Abused: Not on file    Physically Abused: Not on file    Sexually Abused: Not on file   Housing Stability:     Unable to Pay for Housing in the Last Year: Not on file    Number of [place] in the Last Year: Not on file    Unstable Housing in the Last Year: Not on file       Current Outpatient Medications   Medication Sig Dispense Refill    hydroCHLOROthiazide (HYDRODIURIL) 25 MG tablet Take 25 mg by mouth daily      losartan (COZAAR) 100 MG tablet Take 100 mg by mouth daily      metFORMIN (GLUCOPHAGE-XR) 500 MG extended release tablet Take 500 mg by mouth daily (with breakfast)      erythromycin (ROMYCIN) 5 MG/GM ophthalmic ointment        No current facility-administered medications for this visit.        Allergies   Allergen Reactions    Codeine Nausea And Vomiting Codeine Phosphate. Vital signs:  Ht 5' 2\" (1.575 m)   Wt 190 lb (86.2 kg)   BMI 34.75 kg/m²            Left shoulder exam     Inspection:  Held in a normal posture. Normal contour at the acromioclavicular joint. No swelling, ecchymosis, or erythema about the shoulder. No atrophy appreciated. No scapular winging.      Palpation:  Crepitus. No tenderness of the AC joint. No greater tuberosity tenderness. No tenderness in the bicipital groove.     Range of Motion: Limited passive and active ROM     Strength:  Mild rotator cuff weakness     Stability: No anterior instability. No posterior instability.     Special Tests: Impingement findings are mildly positive.     Other findings: The skin is warm dry and well perfused. 2+ radial pulse. Sensation is intact to light touch over the deltoid.        Right shoulder exam     Inspection:  Held in a normal posture. Normal contour at the acromioclavicular joint. No swelling, ecchymosis, or erythema about the shoulder. No atrophy appreciated. No scapular winging.      Palpation:  Crepitus. No tenderness of the AC joint. No greater tuberosity tenderness. No tenderness in the bicipital groove.     Range of Motion: Limited passive and active ROM     Strength:  Mild rotator cuff weakness     Stability: No anterior instability. No posterior instability.     Special Tests: Impingement findings are mildly positive.     Other findings: The skin is warm dry and well perfused. 2+ radial pulse. Sensation is intact to light touch over the deltoid. Radiology:     Pertinent imaging was interpreted and reviewed with the patient. No new imaging was obtained during today's visit. Assessment :  80 y.o. female with bilateral shoulder osteoarthritis    Impression:  Encounter Diagnosis   Name Primary?  Arthritis of shoulder Yes       Office Procedures:  No orders of the defined types were placed in this encounter. Plan: Pertinent imaging was reviewed. The etiology, natural history, and treatment options for the disorder were discussed. The roles of activity medication, antiinflammatories, injections, bracing, physical therapy, and surgical interventions were all described to the patient and questions were answered. Hedy Saez has bilateral shoulder osteoarthritis. She ultimately will require bilateral shoulder replacements but would like to avoid this as long as she can. Today she is a candidate for a right intra-articular cortisone injection as they have provided good relief in the past. She wishes to proceed. The indications and risks of steroid injection as well as treatment alternatives were discussed with the patient who consented to the procedure. Under sterile conditions and after informed consent was obtained, using posterior approach the patient was given an injection into the RIGHT shoulder split equally between subacromial space and glenohumeral joint. 2mL 40 mg of Depo-Medrol and 4 mL of 1% lidocaine were placed in the shoulder after it was prepped with chlorhexidine. This resulted in good relief of symptoms. There were no complications. The patient was advised to ice the shoulder this evening and to avoid vigorous activities for the next 2 days. They were advised to call us if there was any erythema, enduration, swelling or increasing pain. I will see her back if symptoms persist or worsen. Tone Vizcaino is in agreement with this plan. All questions were answered to patient's satisfaction and was encouraged to call with any further questions. Thao Vicente ATC, am scribing for and in the presence of Dr. Serg Deleon. 05/04/22 1:47 PM Katty Esteves ATC         I attest that I met personally with the patient, performed the described exam, reviewed the radiographic studies and medical records associated with this patient and supervised the services that are described above.      Abad Guzman MD

## 2022-10-25 ENCOUNTER — TELEPHONE (OUTPATIENT)
Dept: ORTHOPEDIC SURGERY | Age: 82
End: 2022-10-25

## 2022-10-25 NOTE — TELEPHONE ENCOUNTER
General Question     Subject: [address] [facility]  Patient and /or Facility Request: Ck [facility]  Contact Number: [phone]      PATIENT CALLED IN TO GET AN APPT FOR COOLIEF PROCEDURE FOR HER SHOULDERS. ..     DR. Mahesh Pruett DON'T DO IT FOR THE SHOULDERS ONLY FOR THE KNEES. .     DO DR. BRIONES HAVING ANY OTHER SUGGESTING FOR THE PATIENT. Roberto Dalton PLEASE CALL PATIENT BACK AT THE ABOVE NUMBER. ..

## 2022-10-25 NOTE — TELEPHONE ENCOUNTER
General Question     Subject: DOES  [id number] [address]  Patient and /or Facility Request: Paige Young Number: [phone]     Pt HAS APPT W/ DR Nicol Villarreal. APPT IS 10/27/22., THIS Thursday. SHE STATES THERE IS A LISTING OF TRAINED DRS IN COOLIEF AND DR BRIONES IS NOT LISTED. Pt DOESN'T WANT TO SHOW UP ONLY TO BE TOLD HE IS NOT ABLE TO DO THIS FOR HER? SHOULD SHE BE SEEING DR FUENTES, INSTEAD? PLEASE CALL Pt TO DISCUSS IF DR BRIONES IS TRAINED IN COOLIEF @ THE NUMBER ABOVE.

## 2022-10-25 NOTE — TELEPHONE ENCOUNTER
S/W pt to inform her that Dr. Janie Oneal does not do COOLIEF procedures. Pt inquired why does Dr. Janie Oneal not do these procedures? I advised pt that Dr. Janie Oneal is a specialist in regenerative medicine and biologics, such as PRP, for which we would be happy to consult for the pt's shld arthitis, and to my knowledge, Deana Burns is more about ablating the nerve to stop the pain. Pt v/u.

## 2022-11-30 ENCOUNTER — OFFICE VISIT (OUTPATIENT)
Dept: ORTHOPEDIC SURGERY | Age: 82
End: 2022-11-30

## 2022-11-30 VITALS — WEIGHT: 190 LBS | HEIGHT: 62 IN | BODY MASS INDEX: 34.96 KG/M2

## 2022-11-30 DIAGNOSIS — M19.012 LOCALIZED OSTEOARTHRITIS OF LEFT SHOULDER: Primary | ICD-10-CM

## 2022-11-30 RX ORDER — METHYLPREDNISOLONE ACETATE 40 MG/ML
40 INJECTION, SUSPENSION INTRA-ARTICULAR; INTRALESIONAL; INTRAMUSCULAR; SOFT TISSUE ONCE
Status: SHIPPED | OUTPATIENT
Start: 2022-11-30

## 2022-11-30 NOTE — PROGRESS NOTES
Follow-up (F/U Left shoulder OA/)      History of Present Illness:  Keon Pacheco is a [de-identified] y.o. female follow-up regarding her right shoulder. She has known bilateral shoulder osteoarthritis. She underwent a left shoulder intra-articular cortisone injection back on 4/27/2022 which provided relief for 6 months. She is here today hoping for a repeat injection. Of note she is a candidate for a reverse shoulder arthroplasty but has a history of breast cancer and ovarian cancer would like to avoid surgery at all costs. Medical History:  Patient's medications, allergies, past medical, surgical, social and family histories were reviewed and updated as appropriate. Pain Assessment  Location of Pain: Shoulder  Location Modifiers: Left  Severity of Pain: 5  Quality of Pain: Dull, Aching  Duration of Pain: Persistent  Frequency of Pain: Constant  Aggravating Factors: Other (Comment) (All mevement)  Limiting Behavior: Yes  Relieving Factors: Other (Comment) (Fastum Gel)  Result of Injury: No  Work-Related Injury: No  Are there other pain locations you wish to document?: No  ROS: Review of systems reviewed from Patient History Form completed today and available in the patient's chart under the Media tab. Pertinent items are noted in HPI  Review of systems reviewed from Patient History Form completed today and available in the patient's chart under the Media tab. Vital Signs:  Ht 5' 2\" (1.575 m)   Wt 190 lb (86.2 kg)   BMI 34.75 kg/m²   Left shoulder exam    Inspection:  Held in a normal posture. Normal contour at the acromioclavicular joint. No swelling, ecchymosis, or erythema about the shoulder. No atrophy appreciated. No scapular winging. Palpation:  Crepitus. No tenderness of the AC joint. No greater tuberosity tenderness. No tenderness in the bicipital groove. Range of Motion: Limited passive and active ROM    Strength:  Mild rotator cuff weakness    Stability: No anterior instability.  No posterior instability. Special Tests: Impingement findings are mildly positive. Other findings: The skin is warm dry and well perfused. 2+ radial pulse. Sensation is intact to light touch over the deltoid. Radiology:       Pertinent imaging interpreted and reviewed with the patient today, images only - no report available. No new imaging was obtained during today's visit. Assessment :  [de-identified] y.o. female with left shoulder osteoarthritis    Impression:  Encounter Diagnosis   Name Primary? Localized osteoarthritis of left shoulder Yes       Office Procedures:  No orders of the defined types were placed in this encounter. Plan:   Pertinent imaging was reviewed. The etiology, natural history, and treatment options for the disorder were discussed. The roles of activity medication, antiinflammatories, injections, bracing, physical therapy, and surgical interventions were all described to the patient and questions were answered. She has severe advanced left shoulder arthritis. She has responded well to cortisone in the past providing upper 6 months of relief. At this time I do believe is a candidate for repeat intra-articular cortisone injection. She would like to proceed with this. She was advised that she is a good candidate for a shoulder replacement and we would recommend if no improvement from the cortisone injection. The indications and risks of steroid injection as well as treatment alternatives were discussed with the patient who consented to the procedure. Under sterile conditions and after informed consent was obtained, using posterior approach the patient was given an injection into the left shoulder split equally between subacromial space and glenohumeral joint. 2mL 40 mg of Depo-Medrol and 4 mL of 1% lidocaine were placed in the shoulder after it was prepped with chlorhexidine. This resulted in good relief of symptoms. There were no complications.  The patient was advised to ice the shoulder this evening and to avoid vigorous activities for the next 2 days. They were advised to call us if there was any erythema, enduration, swelling or increasing pain. Shaylee Harrington is in agreement with this plan. All questions were answered to patient's satisfaction and was encouraged to call with any further questions. Total time spent for evaluation, education, and development of treatment plan: 35 minutes    David Linda, [address] [facility]  11/30/2022    During this exam, I, David Linda PA-C, functioned as a scribe for Dr. Nuha Chowdhury. The history taking and physical examination were performed by Dr. Nuha Chowdhury. All counseling during the appointment was performed between the patient and Dr. Nuha Chowdhury. 11/30/2022 3:54 PM    This dictation was performed with a verbal recognition program (DRAGON) and it was checked for errors. It is possible that there are still dictated errors within this office note. If so, please bring any areas to my attention for an addendum. All efforts were made to ensure that this office note is accurate. I attest that I met personally with the patient, performed the described exam, reviewed the radiographic studies and medical records associated with this patient and supervised the services that are described above.      Zackary Braun MD

## 2022-12-07 ENCOUNTER — OFFICE VISIT (OUTPATIENT)
Dept: ORTHOPEDIC SURGERY | Age: 82
End: 2022-12-07
Payer: MEDICARE

## 2022-12-07 VITALS — WEIGHT: 190 LBS | HEIGHT: 62 IN | BODY MASS INDEX: 34.96 KG/M2

## 2022-12-07 DIAGNOSIS — M19.012 LOCALIZED OSTEOARTHRITIS OF LEFT SHOULDER: Primary | ICD-10-CM

## 2022-12-07 DIAGNOSIS — M19.011 LOCALIZED OSTEOARTHRITIS OF RIGHT SHOULDER: ICD-10-CM

## 2022-12-07 PROCEDURE — 20610 DRAIN/INJ JOINT/BURSA W/O US: CPT | Performed by: ORTHOPAEDIC SURGERY

## 2022-12-07 RX ORDER — METHYLPREDNISOLONE ACETATE 40 MG/ML
40 INJECTION, SUSPENSION INTRA-ARTICULAR; INTRALESIONAL; INTRAMUSCULAR; SOFT TISSUE ONCE
Status: COMPLETED | OUTPATIENT
Start: 2022-12-07 | End: 2022-12-07

## 2022-12-07 RX ORDER — LIDOCAINE HYDROCHLORIDE 10 MG/ML
20 INJECTION, SOLUTION INFILTRATION; PERINEURAL ONCE
Status: COMPLETED | OUTPATIENT
Start: 2022-12-07 | End: 2022-12-07

## 2022-12-07 RX ADMIN — LIDOCAINE HYDROCHLORIDE 20 ML: 10 INJECTION, SOLUTION INFILTRATION; PERINEURAL at 15:30

## 2022-12-07 RX ADMIN — METHYLPREDNISOLONE ACETATE 40 MG: 40 INJECTION, SUSPENSION INTRA-ARTICULAR; INTRALESIONAL; INTRAMUSCULAR; SOFT TISSUE at 15:29

## 2022-12-07 NOTE — PROGRESS NOTES
Shoulder Pain (Bilateral shoulder)      History of Present Illness:  Collette Melo is a [de-identified] y.o. female here for follow-up regarding her bilateral shoulders. She has known bilateral shoulder osteoarthritis. She underwent a left intra-articular steroid injection on 11/30/2022 which provided significant relief. She is here today hoping for a right shoulder intra-articular cortisone injection. Of note she is a candidate for reverse total shoulder arthroplasty bilaterally however she has a history of breast cancer and ovarian cancer would like to avoid surgery at all cost.    Medical History:  Patient's medications, allergies, past medical, surgical, social and family histories were reviewed and updated as appropriate. Pain Assessment  Location of Pain: Shoulder  Location Modifiers: Right, Left  Severity of Pain: 6  Quality of Pain: Dull, Aching  Duration of Pain: Persistent  Frequency of Pain: Constant  Aggravating Factors: Other (Comment) (Everything)  Limiting Behavior: Yes  Relieving Factors:  (Fastum)  Result of Injury: No  Work-Related Injury: No  Are there other pain locations you wish to document?: No  ROS: Review of systems reviewed from Patient History Form completed today and available in the patient's chart under the Media tab. Pertinent items are noted in HPI  Review of systems reviewed from Patient History Form completed today and available in the patient's chart under the Media tab. Vital Signs:  Ht 5' 2\" (1.575 m)   Wt 190 lb (86.2 kg)   BMI 34.75 kg/m²     Right shoulder exam    Inspection:  Held in a normal posture. Normal contour at the acromioclavicular joint. No swelling, ecchymosis, or erythema about the shoulder. No atrophy appreciated. No scapular winging. Palpation:  Crepitus. No tenderness of the AC joint. No greater tuberosity tenderness. No tenderness in the bicipital groove.     Range of Motion: Limited passive and active ROM    Strength:  Mild rotator cuff weakness    Stability: No anterior instability. No posterior instability. Special Tests: Impingement findings are mildly positive. Other findings: The skin is warm dry and well perfused. 2+ radial pulse. Sensation is intact to light touch over the deltoid. Left shoulder exam    Inspection:  Held in a normal posture. Normal contour at the acromioclavicular joint. No swelling, ecchymosis, or erythema about the shoulder. No atrophy appreciated. No scapular winging. Palpation:  Crepitus. No tenderness of the AC joint. No greater tuberosity tenderness. No tenderness in the bicipital groove. Range of Motion: Limited passive and active ROM    Strength:  Mild rotator cuff weakness    Stability: No anterior instability. No posterior instability. Special Tests: Impingement findings are mildly positive. Other findings: The skin is warm dry and well perfused. 2+ radial pulse. Sensation is intact to light touch over the deltoid. Radiology:       Pertinent imaging interpreted and reviewed with the patient today, images only - no report available. No new imaging was obtained during today's visit. Assessment :  [de-identified] y.o. female with bilateral shoulder OA    Impression:  Encounter Diagnoses   Name Primary? Localized osteoarthritis of left shoulder Yes    Localized osteoarthritis of right shoulder        Office Procedures:  Orders Placed This Encounter   Procedures    [id number] - [place] DRAIN/INJECT LARGE JOINT/BURSA             Plan:   Pertinent imaging was reviewed. The etiology, natural history, and treatment options for the disorder were discussed. The roles of activity medication, antiinflammatories, injections, bracing, physical therapy, and surgical interventions were all described to the patient and questions were answered. Patient has severe advanced bilateral shoulder osteoarthritis.   Fortunately she experienced significant relief from her left shoulder intra-articular cortisone injection. Today I believe she is a candidate for right shoulder cortisone injection. She would like to proceed with this. The indications and risks of steroid injection as well as treatment alternatives were discussed with the patient who consented to the procedure. Under sterile conditions and after informed consent was obtained, using posterior approach the patient was given an injection into the right shoulder split equally between subacromial space and glenohumeral joint. 2mL 40 mg of Depo-Medrol and 4 mL of 1% lidocaine were placed in the shoulder after it was prepped with chlorhexidine. This resulted in good relief of symptoms. There were no complications. The patient was advised to ice the shoulder this evening and to avoid vigorous activities for the next 2 days. They were advised to call us if there was any erythema, enduration, swelling or increasing pain. Bertha Lilly is in agreement with this plan. All questions were answered to patient's satisfaction and was encouraged to call with any further questions. Total time spent for evaluation, education, and development of treatment plan: 38 minutes    Wendy Carmona, [facility]  12/7/2022    During this exam, Wendy SEBASTIAN PA-C, functioned as a scribe for Dr. Zoe Benz. The history taking and physical examination were performed by Dr. Zoe Benz. All counseling during the appointment was performed between the patient and Dr. Zoe Benz. 12/7/2022 3:20 PM    This dictation was performed with a verbal recognition program (DRAGON) and it was checked for errors. It is possible that there are still dictated errors within this office note. If so, please bring any areas to my attention for an addendum. All efforts were made to ensure that this office note is accurate.       I attest that I met personally with the patient, performed the described exam, reviewed the radiographic studies and medical records associated with this patient and supervised the services that are described above.      Jasen Solorzano MD

## 2023-05-05 ENCOUNTER — OFFICE VISIT (OUTPATIENT)
Dept: ORTHOPEDIC SURGERY | Age: 83
End: 2023-05-05

## 2023-05-05 VITALS — WEIGHT: 197 LBS | HEIGHT: 62 IN | BODY MASS INDEX: 36.25 KG/M2

## 2023-05-05 DIAGNOSIS — M25.512 LEFT SHOULDER PAIN, UNSPECIFIED CHRONICITY: Primary | ICD-10-CM

## 2023-05-05 RX ORDER — METHYLPREDNISOLONE ACETATE 40 MG/ML
40 INJECTION, SUSPENSION INTRA-ARTICULAR; INTRALESIONAL; INTRAMUSCULAR; SOFT TISSUE ONCE
Status: COMPLETED | OUTPATIENT
Start: 2023-05-05 | End: 2023-05-05

## 2023-05-05 RX ADMIN — METHYLPREDNISOLONE ACETATE 40 MG: 40 INJECTION, SUSPENSION INTRA-ARTICULAR; INTRALESIONAL; INTRAMUSCULAR; SOFT TISSUE at 12:43

## 2023-06-07 ENCOUNTER — OFFICE VISIT (OUTPATIENT)
Dept: ORTHOPEDIC SURGERY | Age: 83
End: 2023-06-07

## 2023-06-07 VITALS — BODY MASS INDEX: 34.96 KG/M2 | HEIGHT: 62 IN | WEIGHT: 190 LBS

## 2023-06-07 DIAGNOSIS — M25.511 RIGHT SHOULDER PAIN, UNSPECIFIED CHRONICITY: Primary | ICD-10-CM

## 2023-06-07 RX ORDER — METHYLPREDNISOLONE ACETATE 40 MG/ML
40 INJECTION, SUSPENSION INTRA-ARTICULAR; INTRALESIONAL; INTRAMUSCULAR; SOFT TISSUE ONCE
Status: COMPLETED | OUTPATIENT
Start: 2023-06-07 | End: 2023-06-07

## 2023-06-07 RX ADMIN — METHYLPREDNISOLONE ACETATE 40 MG: 40 INJECTION, SUSPENSION INTRA-ARTICULAR; INTRALESIONAL; INTRAMUSCULAR; SOFT TISSUE at 17:52

## 2023-06-07 NOTE — PROGRESS NOTES
Chief Complaint    Follow-up (Right shoulder. )      History of Present Illness:  Missy Kunz is a [de-identified] y.o. female here today for follow up evaluation of the right shoulder. She has known bilateral shoulder osteoarthritis. She underwent a left intra-articular steroid injection on 5/5/2023 and the right shoulder on 12/7/2022 which provided significant relief. She returns today with an exacerbation of symptoms with significant pain and limited range of motion. Of note she is a candidate for reverse total shoulder arthroplasty bilaterally however she has a history of breast cancer and ovarian cancer would like to avoid surgery at all cost.    Medical History:  Patient's medications, allergies, past medical, surgical, social and family histories were reviewed and updated as appropriate. Pertinent items are noted in HPI  Review of systems reviewed from Patient History Form completed today and available in the patient's chart under the Media tab. Pain Assessment  Location of Pain: Shoulder  Location Modifiers: Right  Severity of Pain: 4  Quality of Pain: Dull, Aching  Duration of Pain: Persistent  Frequency of Pain: Constant  Aggravating Factors:  (any movement)  Limiting Behavior: Yes  Relieving Factors: Nsaids  Result of Injury: No  Work-Related Injury: No  Are there other pain locations you wish to document?: No    Past Medical History:   Diagnosis Date    Cancer ([facility] [address]     Cervical spondylosis 5/19/2015    Glenohumeral arthritis 5/19/2015        History reviewed. No pertinent surgical history. History reviewed. No pertinent family history.     Social History     Socioeconomic History    Marital status:      Spouse name: None    Number of children: None    Years of education: None    Highest education level: None   Tobacco Use    Smoking status: Never    Smokeless tobacco: Never       Current Outpatient Medications   Medication Sig Dispense Refill    hydroCHLOROthiazide (HYDRODIURIL) 25 MG tablet